# Patient Record
Sex: MALE | Race: WHITE | Employment: OTHER | URBAN - METROPOLITAN AREA
[De-identification: names, ages, dates, MRNs, and addresses within clinical notes are randomized per-mention and may not be internally consistent; named-entity substitution may affect disease eponyms.]

---

## 2019-03-26 ENCOUNTER — ANESTHESIA EVENT (OUTPATIENT)
Dept: OPERATING ROOM | Age: 69
DRG: 027 | End: 2019-03-26
Payer: OTHER GOVERNMENT

## 2019-03-26 RX ORDER — CLOPIDOGREL BISULFATE 75 MG/1
75 TABLET ORAL DAILY
Status: ON HOLD | COMMUNITY
End: 2019-03-29 | Stop reason: HOSPADM

## 2019-03-26 RX ORDER — ATORVASTATIN CALCIUM 80 MG/1
40 TABLET, FILM COATED ORAL DAILY
COMMUNITY

## 2019-03-26 RX ORDER — CARVEDILOL 25 MG/1
12.5 TABLET ORAL 2 TIMES DAILY WITH MEALS
COMMUNITY

## 2019-03-27 ENCOUNTER — HOSPITAL ENCOUNTER (INPATIENT)
Age: 69
LOS: 3 days | Discharge: HOME | DRG: 027 | End: 2019-03-30
Attending: NEUROLOGICAL SURGERY | Admitting: NEUROLOGICAL SURGERY
Payer: OTHER GOVERNMENT

## 2019-03-27 ENCOUNTER — ANESTHESIA (OUTPATIENT)
Dept: OPERATING ROOM | Age: 69
DRG: 027 | End: 2019-03-27
Payer: OTHER GOVERNMENT

## 2019-03-27 ENCOUNTER — APPOINTMENT (OUTPATIENT)
Dept: CT IMAGING | Age: 69
DRG: 027 | End: 2019-03-27
Attending: NEUROLOGICAL SURGERY
Payer: OTHER GOVERNMENT

## 2019-03-27 VITALS — TEMPERATURE: 66.7 F | DIASTOLIC BLOOD PRESSURE: 59 MMHG | OXYGEN SATURATION: 100 % | SYSTOLIC BLOOD PRESSURE: 107 MMHG

## 2019-03-27 DIAGNOSIS — Z98.890 S/P CRANIOTOMY: Primary | ICD-10-CM

## 2019-03-27 LAB
ABO/RH: NORMAL
ANTIBODY SCREEN: NORMAL
BASE EXCESS ARTERIAL: -2 (ref -3–3)
BASE EXCESS ARTERIAL: -3 (ref -3–3)
BASE EXCESS ARTERIAL: -3 (ref -3–3)
CALCIUM IONIZED: 1.14 MMOL/L (ref 1.12–1.32)
CALCIUM IONIZED: 1.15 MMOL/L (ref 1.12–1.32)
CALCIUM IONIZED: 1.19 MMOL/L (ref 1.12–1.32)
CREAT SERPL-MCNC: 0.8 MG/DL (ref 0.8–1.3)
GFR AFRICAN AMERICAN: >60
GFR NON-AFRICAN AMERICAN: >60
GLUCOSE BLD-MCNC: 153 MG/DL (ref 70–99)
GLUCOSE BLD-MCNC: 165 MG/DL (ref 70–99)
GLUCOSE BLD-MCNC: 174 MG/DL (ref 70–99)
GLUCOSE BLD-MCNC: 191 MG/DL (ref 70–99)
HCO3 ARTERIAL: 22.3 MMOL/L (ref 21–29)
HCO3 ARTERIAL: 22.6 MMOL/L (ref 21–29)
HCO3 ARTERIAL: 22.7 MMOL/L (ref 21–29)
MAGNESIUM: 2.2 MG/DL (ref 1.8–2.4)
O2 SAT, ARTERIAL: 100 % (ref 93–100)
O2 SAT, ARTERIAL: 96 % (ref 93–100)
O2 SAT, ARTERIAL: 97 % (ref 93–100)
PCO2 ARTERIAL: 35 MM HG (ref 35–45)
PCO2 ARTERIAL: 39.1 MM HG (ref 35–45)
PCO2 ARTERIAL: 39.2 MM HG (ref 35–45)
PERFORMED ON: ABNORMAL
PH ARTERIAL: 7.36 (ref 7.35–7.45)
PH ARTERIAL: 7.37 (ref 7.35–7.45)
PH ARTERIAL: 7.42 (ref 7.35–7.45)
PO2 ARTERIAL: 215.6 MM HG (ref 75–108)
PO2 ARTERIAL: 82.1 MM HG (ref 75–108)
PO2 ARTERIAL: 91 MM HG (ref 75–108)
POC POTASSIUM: 3.6 MMOL/L (ref 3.5–5.1)
POC POTASSIUM: 3.7 MMOL/L (ref 3.5–5.1)
POC POTASSIUM: 4.9 MMOL/L (ref 3.5–5.1)
POC SAMPLE TYPE: ABNORMAL
POC SODIUM: 139 MMOL/L (ref 136–145)
POC SODIUM: 144 MMOL/L (ref 136–145)
POC SODIUM: 145 MMOL/L (ref 136–145)
POTASSIUM SERPL-SCNC: 4 MMOL/L (ref 3.5–5.1)
TCO2 ARTERIAL: 24 MMOL/L

## 2019-03-27 PROCEDURE — 6360000002 HC RX W HCPCS: Performed by: NURSE ANESTHETIST, CERTIFIED REGISTERED

## 2019-03-27 PROCEDURE — 2500000003 HC RX 250 WO HCPCS: Performed by: NURSE ANESTHETIST, CERTIFIED REGISTERED

## 2019-03-27 PROCEDURE — 2580000003 HC RX 258: Performed by: NEUROLOGICAL SURGERY

## 2019-03-27 PROCEDURE — 88307 TISSUE EXAM BY PATHOLOGIST: CPT

## 2019-03-27 PROCEDURE — 7100000000 HC PACU RECOVERY - FIRST 15 MIN: Performed by: NEUROLOGICAL SURGERY

## 2019-03-27 PROCEDURE — 3700000001 HC ADD 15 MINUTES (ANESTHESIA): Performed by: NEUROLOGICAL SURGERY

## 2019-03-27 PROCEDURE — 2780000010 HC IMPLANT OTHER: Performed by: NEUROLOGICAL SURGERY

## 2019-03-27 PROCEDURE — 6360000002 HC RX W HCPCS

## 2019-03-27 PROCEDURE — 82947 ASSAY GLUCOSE BLOOD QUANT: CPT

## 2019-03-27 PROCEDURE — 3600000014 HC SURGERY LEVEL 4 ADDTL 15MIN: Performed by: NEUROLOGICAL SURGERY

## 2019-03-27 PROCEDURE — 82565 ASSAY OF CREATININE: CPT

## 2019-03-27 PROCEDURE — 6360000002 HC RX W HCPCS: Performed by: NEUROLOGICAL SURGERY

## 2019-03-27 PROCEDURE — 6370000000 HC RX 637 (ALT 250 FOR IP): Performed by: NEUROLOGICAL SURGERY

## 2019-03-27 PROCEDURE — 2500000003 HC RX 250 WO HCPCS: Performed by: ANESTHESIOLOGY

## 2019-03-27 PROCEDURE — 6360000002 HC RX W HCPCS: Performed by: ANESTHESIOLOGY

## 2019-03-27 PROCEDURE — 6370000000 HC RX 637 (ALT 250 FOR IP)

## 2019-03-27 PROCEDURE — 84295 ASSAY OF SERUM SODIUM: CPT

## 2019-03-27 PROCEDURE — 3700000000 HC ANESTHESIA ATTENDED CARE: Performed by: NEUROLOGICAL SURGERY

## 2019-03-27 PROCEDURE — 82330 ASSAY OF CALCIUM: CPT

## 2019-03-27 PROCEDURE — 86850 RBC ANTIBODY SCREEN: CPT

## 2019-03-27 PROCEDURE — P9041 ALBUMIN (HUMAN),5%, 50ML: HCPCS | Performed by: NURSE ANESTHETIST, CERTIFIED REGISTERED

## 2019-03-27 PROCEDURE — 82803 BLOOD GASES ANY COMBINATION: CPT

## 2019-03-27 PROCEDURE — 94761 N-INVAS EAR/PLS OXIMETRY MLT: CPT

## 2019-03-27 PROCEDURE — 2720000010 HC SURG SUPPLY STERILE: Performed by: NEUROLOGICAL SURGERY

## 2019-03-27 PROCEDURE — 2580000003 HC RX 258: Performed by: ANESTHESIOLOGY

## 2019-03-27 PROCEDURE — 7100000001 HC PACU RECOVERY - ADDTL 15 MIN: Performed by: NEUROLOGICAL SURGERY

## 2019-03-27 PROCEDURE — 2709999900 HC NON-CHARGEABLE SUPPLY: Performed by: NEUROLOGICAL SURGERY

## 2019-03-27 PROCEDURE — 6360000002 HC RX W HCPCS: Performed by: NURSE PRACTITIONER

## 2019-03-27 PROCEDURE — 6370000000 HC RX 637 (ALT 250 FOR IP): Performed by: STUDENT IN AN ORGANIZED HEALTH CARE EDUCATION/TRAINING PROGRAM

## 2019-03-27 PROCEDURE — 86900 BLOOD TYPING SEROLOGIC ABO: CPT

## 2019-03-27 PROCEDURE — C1713 ANCHOR/SCREW BN/BN,TIS/BN: HCPCS | Performed by: NEUROLOGICAL SURGERY

## 2019-03-27 PROCEDURE — 2580000003 HC RX 258: Performed by: NURSE ANESTHETIST, CERTIFIED REGISTERED

## 2019-03-27 PROCEDURE — 2500000003 HC RX 250 WO HCPCS: Performed by: NEUROLOGICAL SURGERY

## 2019-03-27 PROCEDURE — 83735 ASSAY OF MAGNESIUM: CPT

## 2019-03-27 PROCEDURE — 70450 CT HEAD/BRAIN W/O DYE: CPT

## 2019-03-27 PROCEDURE — 88331 PATH CONSLTJ SURG 1 BLK 1SPC: CPT

## 2019-03-27 PROCEDURE — 86901 BLOOD TYPING SEROLOGIC RH(D): CPT

## 2019-03-27 PROCEDURE — 84132 ASSAY OF SERUM POTASSIUM: CPT

## 2019-03-27 PROCEDURE — 94660 CPAP INITIATION&MGMT: CPT

## 2019-03-27 PROCEDURE — 6370000000 HC RX 637 (ALT 250 FOR IP): Performed by: NURSE PRACTITIONER

## 2019-03-27 PROCEDURE — 36415 COLL VENOUS BLD VENIPUNCTURE: CPT

## 2019-03-27 PROCEDURE — 2500000003 HC RX 250 WO HCPCS: Performed by: STUDENT IN AN ORGANIZED HEALTH CARE EDUCATION/TRAINING PROGRAM

## 2019-03-27 PROCEDURE — 2000000000 HC ICU R&B

## 2019-03-27 PROCEDURE — 2700000000 HC OXYGEN THERAPY PER DAY

## 2019-03-27 PROCEDURE — 94664 DEMO&/EVAL PT USE INHALER: CPT

## 2019-03-27 PROCEDURE — 94640 AIRWAY INHALATION TREATMENT: CPT

## 2019-03-27 PROCEDURE — 3600000004 HC SURGERY LEVEL 4 BASE: Performed by: NEUROLOGICAL SURGERY

## 2019-03-27 PROCEDURE — 2580000003 HC RX 258: Performed by: NURSE PRACTITIONER

## 2019-03-27 DEVICE — SEALANT SURG 13 YR DURA AUTOSPRAY ADHERUS NUS106] SURGICAL ONE]: Type: IMPLANTABLE DEVICE | Site: CRANIAL | Status: FUNCTIONAL

## 2019-03-27 DEVICE — SCREW BNE L4MM DIA1.5MM CRANIOFACIAL TI SELF DRL: Type: IMPLANTABLE DEVICE | Site: CRANIAL | Status: FUNCTIONAL

## 2019-03-27 DEVICE — ULTRA THIN SHEET
Type: IMPLANTABLE DEVICE | Site: CRANIAL | Status: FUNCTIONAL
Brand: MEDPOR

## 2019-03-27 RX ORDER — OXYCODONE HYDROCHLORIDE AND ACETAMINOPHEN 5; 325 MG/1; MG/1
1 TABLET ORAL EVERY 4 HOURS PRN
Status: DISCONTINUED | OUTPATIENT
Start: 2019-03-27 | End: 2019-03-30 | Stop reason: HOSPADM

## 2019-03-27 RX ORDER — METOPROLOL TARTRATE 5 MG/5ML
INJECTION INTRAVENOUS
Status: DISCONTINUED
Start: 2019-03-27 | End: 2019-03-27 | Stop reason: WASHOUT

## 2019-03-27 RX ORDER — LEVETIRACETAM 500 MG/1
500 TABLET ORAL 2 TIMES DAILY
Status: DISCONTINUED | OUTPATIENT
Start: 2019-03-27 | End: 2019-03-30 | Stop reason: HOSPADM

## 2019-03-27 RX ORDER — HEPARIN SODIUM 5000 [USP'U]/ML
5000 INJECTION, SOLUTION INTRAVENOUS; SUBCUTANEOUS EVERY 8 HOURS
Status: DISCONTINUED | OUTPATIENT
Start: 2019-03-28 | End: 2019-03-30 | Stop reason: HOSPADM

## 2019-03-27 RX ORDER — CEFAZOLIN SODIUM 2 G/50ML
2 SOLUTION INTRAVENOUS ONCE
Status: COMPLETED | OUTPATIENT
Start: 2019-03-27 | End: 2019-03-27

## 2019-03-27 RX ORDER — PROPOFOL 10 MG/ML
INJECTION, EMULSION INTRAVENOUS PRN
Status: DISCONTINUED | OUTPATIENT
Start: 2019-03-27 | End: 2019-03-27 | Stop reason: SDUPTHER

## 2019-03-27 RX ORDER — FENTANYL CITRATE 50 UG/ML
50 INJECTION, SOLUTION INTRAMUSCULAR; INTRAVENOUS EVERY 5 MIN PRN
Status: DISCONTINUED | OUTPATIENT
Start: 2019-03-27 | End: 2019-03-27 | Stop reason: HOSPADM

## 2019-03-27 RX ORDER — SODIUM CHLORIDE 0.9 % (FLUSH) 0.9 %
10 SYRINGE (ML) INJECTION PRN
Status: DISCONTINUED | OUTPATIENT
Start: 2019-03-27 | End: 2019-03-30 | Stop reason: HOSPADM

## 2019-03-27 RX ORDER — ATORVASTATIN CALCIUM 80 MG/1
80 TABLET, FILM COATED ORAL DAILY
Status: DISCONTINUED | OUTPATIENT
Start: 2019-03-28 | End: 2019-03-28

## 2019-03-27 RX ORDER — CEFAZOLIN SODIUM 2 G/50ML
2 SOLUTION INTRAVENOUS EVERY 8 HOURS
Status: COMPLETED | OUTPATIENT
Start: 2019-03-28 | End: 2019-03-28

## 2019-03-27 RX ORDER — METOPROLOL TARTRATE 5 MG/5ML
INJECTION INTRAVENOUS PRN
Status: DISCONTINUED | OUTPATIENT
Start: 2019-03-27 | End: 2019-03-27 | Stop reason: SDUPTHER

## 2019-03-27 RX ORDER — PROMETHAZINE HYDROCHLORIDE 25 MG/ML
6.25 INJECTION, SOLUTION INTRAMUSCULAR; INTRAVENOUS
Status: DISCONTINUED | OUTPATIENT
Start: 2019-03-27 | End: 2019-03-27 | Stop reason: HOSPADM

## 2019-03-27 RX ORDER — MAGNESIUM SULFATE HEPTAHYDRATE 500 MG/ML
INJECTION, SOLUTION INTRAMUSCULAR; INTRAVENOUS PRN
Status: DISCONTINUED | OUTPATIENT
Start: 2019-03-27 | End: 2019-03-27 | Stop reason: SDUPTHER

## 2019-03-27 RX ORDER — SODIUM CHLORIDE, SODIUM LACTATE, POTASSIUM CHLORIDE, CALCIUM CHLORIDE 600; 310; 30; 20 MG/100ML; MG/100ML; MG/100ML; MG/100ML
INJECTION, SOLUTION INTRAVENOUS CONTINUOUS PRN
Status: DISCONTINUED | OUTPATIENT
Start: 2019-03-27 | End: 2019-03-27 | Stop reason: SDUPTHER

## 2019-03-27 RX ORDER — BISACODYL 10 MG
10 SUPPOSITORY, RECTAL RECTAL DAILY PRN
Status: DISCONTINUED | OUTPATIENT
Start: 2019-03-27 | End: 2019-03-30 | Stop reason: HOSPADM

## 2019-03-27 RX ORDER — NALOXONE HYDROCHLORIDE 0.4 MG/ML
0.2 INJECTION, SOLUTION INTRAMUSCULAR; INTRAVENOUS; SUBCUTANEOUS PRN
Status: DISCONTINUED | OUTPATIENT
Start: 2019-03-27 | End: 2019-03-30 | Stop reason: HOSPADM

## 2019-03-27 RX ORDER — CARVEDILOL 25 MG/1
25 TABLET ORAL 2 TIMES DAILY WITH MEALS
Status: DISCONTINUED | OUTPATIENT
Start: 2019-03-28 | End: 2019-03-28

## 2019-03-27 RX ORDER — LIDOCAINE HYDROCHLORIDE 20 MG/ML
INJECTION, SOLUTION INFILTRATION; PERINEURAL PRN
Status: DISCONTINUED | OUTPATIENT
Start: 2019-03-27 | End: 2019-03-27 | Stop reason: SDUPTHER

## 2019-03-27 RX ORDER — FENTANYL CITRATE 50 UG/ML
INJECTION, SOLUTION INTRAMUSCULAR; INTRAVENOUS PRN
Status: DISCONTINUED | OUTPATIENT
Start: 2019-03-27 | End: 2019-03-27 | Stop reason: SDUPTHER

## 2019-03-27 RX ORDER — FENTANYL CITRATE 50 UG/ML
25 INJECTION, SOLUTION INTRAMUSCULAR; INTRAVENOUS EVERY 5 MIN PRN
Status: DISCONTINUED | OUTPATIENT
Start: 2019-03-27 | End: 2019-03-27 | Stop reason: HOSPADM

## 2019-03-27 RX ORDER — HYDROMORPHONE HCL 110MG/55ML
PATIENT CONTROLLED ANALGESIA SYRINGE INTRAVENOUS PRN
Status: DISCONTINUED | OUTPATIENT
Start: 2019-03-27 | End: 2019-03-27 | Stop reason: SDUPTHER

## 2019-03-27 RX ORDER — MAGNESIUM HYDROXIDE 1200 MG/15ML
LIQUID ORAL CONTINUOUS PRN
Status: DISCONTINUED | OUTPATIENT
Start: 2019-03-27 | End: 2019-03-27 | Stop reason: HOSPADM

## 2019-03-27 RX ORDER — EPHEDRINE SULFATE 50 MG/ML
INJECTION, SOLUTION INTRAVENOUS PRN
Status: DISCONTINUED | OUTPATIENT
Start: 2019-03-27 | End: 2019-03-27 | Stop reason: SDUPTHER

## 2019-03-27 RX ORDER — PROPOFOL 10 MG/ML
INJECTION, EMULSION INTRAVENOUS CONTINUOUS PRN
Status: DISCONTINUED | OUTPATIENT
Start: 2019-03-27 | End: 2019-03-27 | Stop reason: SDUPTHER

## 2019-03-27 RX ORDER — SODIUM CHLORIDE 9 MG/ML
INJECTION, SOLUTION INTRAVENOUS CONTINUOUS
Status: DISCONTINUED | OUTPATIENT
Start: 2019-03-27 | End: 2019-03-28

## 2019-03-27 RX ORDER — SODIUM CHLORIDE, SODIUM LACTATE, POTASSIUM CHLORIDE, CALCIUM CHLORIDE 600; 310; 30; 20 MG/100ML; MG/100ML; MG/100ML; MG/100ML
INJECTION, SOLUTION INTRAVENOUS CONTINUOUS
Status: DISCONTINUED | OUTPATIENT
Start: 2019-03-27 | End: 2019-03-27

## 2019-03-27 RX ORDER — SODIUM CHLORIDE 0.9 % (FLUSH) 0.9 %
10 SYRINGE (ML) INJECTION EVERY 12 HOURS SCHEDULED
Status: DISCONTINUED | OUTPATIENT
Start: 2019-03-27 | End: 2019-03-30 | Stop reason: HOSPADM

## 2019-03-27 RX ORDER — ALBUTEROL SULFATE 2.5 MG/3ML
2.5 SOLUTION RESPIRATORY (INHALATION) ONCE
Status: COMPLETED | OUTPATIENT
Start: 2019-03-27 | End: 2019-03-27

## 2019-03-27 RX ORDER — SENNA AND DOCUSATE SODIUM 50; 8.6 MG/1; MG/1
1 TABLET, FILM COATED ORAL 2 TIMES DAILY
Status: DISCONTINUED | OUTPATIENT
Start: 2019-03-27 | End: 2019-03-30 | Stop reason: HOSPADM

## 2019-03-27 RX ORDER — ACETAMINOPHEN 325 MG/1
650 TABLET ORAL EVERY 4 HOURS PRN
Status: DISCONTINUED | OUTPATIENT
Start: 2019-03-27 | End: 2019-03-30 | Stop reason: HOSPADM

## 2019-03-27 RX ORDER — SODIUM CHLORIDE 9 MG/ML
INJECTION, SOLUTION INTRAVENOUS CONTINUOUS PRN
Status: DISCONTINUED | OUTPATIENT
Start: 2019-03-27 | End: 2019-03-27 | Stop reason: SDUPTHER

## 2019-03-27 RX ORDER — FIBRINOGEN HUMAN AND THROMBIN HUMAN 90; 500 [IU]/ML; [IU]/ML
SOLUTION TOPICAL PRN
Status: DISCONTINUED | OUTPATIENT
Start: 2019-03-27 | End: 2019-03-27 | Stop reason: HOSPADM

## 2019-03-27 RX ORDER — BUPIVACAINE HYDROCHLORIDE AND EPINEPHRINE 5; 5 MG/ML; UG/ML
INJECTION, SOLUTION EPIDURAL; INTRACAUDAL; PERINEURAL PRN
Status: DISCONTINUED | OUTPATIENT
Start: 2019-03-27 | End: 2019-03-27 | Stop reason: HOSPADM

## 2019-03-27 RX ORDER — ONDANSETRON 2 MG/ML
INJECTION INTRAMUSCULAR; INTRAVENOUS PRN
Status: DISCONTINUED | OUTPATIENT
Start: 2019-03-27 | End: 2019-03-27 | Stop reason: SDUPTHER

## 2019-03-27 RX ORDER — GLYCOPYRROLATE 1 MG/5 ML
SYRINGE (ML) INTRAVENOUS PRN
Status: DISCONTINUED | OUTPATIENT
Start: 2019-03-27 | End: 2019-03-27 | Stop reason: SDUPTHER

## 2019-03-27 RX ORDER — ONDANSETRON 2 MG/ML
4 INJECTION INTRAMUSCULAR; INTRAVENOUS
Status: DISCONTINUED | OUTPATIENT
Start: 2019-03-27 | End: 2019-03-27 | Stop reason: HOSPADM

## 2019-03-27 RX ORDER — ONDANSETRON 2 MG/ML
4 INJECTION INTRAMUSCULAR; INTRAVENOUS EVERY 6 HOURS PRN
Status: DISCONTINUED | OUTPATIENT
Start: 2019-03-27 | End: 2019-03-30 | Stop reason: HOSPADM

## 2019-03-27 RX ORDER — ALBUMIN, HUMAN INJ 5% 5 %
SOLUTION INTRAVENOUS PRN
Status: DISCONTINUED | OUTPATIENT
Start: 2019-03-27 | End: 2019-03-27 | Stop reason: SDUPTHER

## 2019-03-27 RX ORDER — ALBUTEROL SULFATE 2.5 MG/3ML
SOLUTION RESPIRATORY (INHALATION)
Status: DISPENSED
Start: 2019-03-27 | End: 2019-03-28

## 2019-03-27 RX ORDER — NALOXONE HYDROCHLORIDE 0.4 MG/ML
INJECTION, SOLUTION INTRAMUSCULAR; INTRAVENOUS; SUBCUTANEOUS
Status: COMPLETED
Start: 2019-03-27 | End: 2019-03-27

## 2019-03-27 RX ORDER — DEXAMETHASONE SODIUM PHOSPHATE 4 MG/ML
INJECTION, SOLUTION INTRA-ARTICULAR; INTRALESIONAL; INTRAMUSCULAR; INTRAVENOUS; SOFT TISSUE PRN
Status: DISCONTINUED | OUTPATIENT
Start: 2019-03-27 | End: 2019-03-27 | Stop reason: SDUPTHER

## 2019-03-27 RX ORDER — SUCCINYLCHOLINE CHLORIDE 20 MG/ML
INJECTION INTRAMUSCULAR; INTRAVENOUS PRN
Status: DISCONTINUED | OUTPATIENT
Start: 2019-03-27 | End: 2019-03-27 | Stop reason: SDUPTHER

## 2019-03-27 RX ORDER — NALOXONE HYDROCHLORIDE 0.4 MG/ML
0.1 INJECTION, SOLUTION INTRAMUSCULAR; INTRAVENOUS; SUBCUTANEOUS PRN
Status: DISCONTINUED | OUTPATIENT
Start: 2019-03-27 | End: 2019-03-27 | Stop reason: HOSPADM

## 2019-03-27 RX ORDER — LABETALOL HYDROCHLORIDE 5 MG/ML
5 INJECTION, SOLUTION INTRAVENOUS EVERY 10 MIN PRN
Status: DISCONTINUED | OUTPATIENT
Start: 2019-03-27 | End: 2019-03-30 | Stop reason: HOSPADM

## 2019-03-27 RX ORDER — POTASSIUM CHLORIDE 7.45 MG/ML
10 INJECTION INTRAVENOUS ONCE
Status: COMPLETED | OUTPATIENT
Start: 2019-03-27 | End: 2019-03-27

## 2019-03-27 RX ORDER — POLYETHYLENE GLYCOL 3350 17 G/17G
17 POWDER, FOR SOLUTION ORAL DAILY PRN
Status: DISCONTINUED | OUTPATIENT
Start: 2019-03-27 | End: 2019-03-30 | Stop reason: HOSPADM

## 2019-03-27 RX ORDER — OXYCODONE HYDROCHLORIDE AND ACETAMINOPHEN 5; 325 MG/1; MG/1
2 TABLET ORAL EVERY 4 HOURS PRN
Status: DISCONTINUED | OUTPATIENT
Start: 2019-03-27 | End: 2019-03-30 | Stop reason: HOSPADM

## 2019-03-27 RX ADMIN — CEFAZOLIN SODIUM 2 G: 2 SOLUTION INTRAVENOUS at 22:41

## 2019-03-27 RX ADMIN — EPHEDRINE SULFATE 10 MG: 50 INJECTION, SOLUTION INTRAMUSCULAR; INTRAVENOUS; SUBCUTANEOUS at 08:46

## 2019-03-27 RX ADMIN — PHENYLEPHRINE HYDROCHLORIDE 10 MCG/MIN: 10 INJECTION, SOLUTION INTRAMUSCULAR; INTRAVENOUS; SUBCUTANEOUS at 08:40

## 2019-03-27 RX ADMIN — ALBUTEROL SULFATE 2.5 MG: 2.5 SOLUTION RESPIRATORY (INHALATION) at 18:56

## 2019-03-27 RX ADMIN — SODIUM CHLORIDE, SODIUM LACTATE, POTASSIUM CHLORIDE, AND CALCIUM CHLORIDE: 600; 310; 30; 20 INJECTION, SOLUTION INTRAVENOUS at 07:47

## 2019-03-27 RX ADMIN — LIDOCAINE HYDROCHLORIDE 200 MG: 20 INJECTION, SOLUTION INFILTRATION; PERINEURAL at 08:36

## 2019-03-27 RX ADMIN — FAMOTIDINE 20 MG: 10 INJECTION, SOLUTION INTRAVENOUS at 08:30

## 2019-03-27 RX ADMIN — FENTANYL CITRATE 50 MCG: 50 INJECTION INTRAMUSCULAR; INTRAVENOUS at 13:29

## 2019-03-27 RX ADMIN — DEXAMETHASONE SODIUM PHOSPHATE 10 MG: 4 INJECTION, SOLUTION INTRAMUSCULAR; INTRAVENOUS at 08:36

## 2019-03-27 RX ADMIN — LEVETIRACETAM 500 MG: 500 TABLET ORAL at 22:41

## 2019-03-27 RX ADMIN — PROPOFOL 150 MCG/KG/MIN: 10 INJECTION, EMULSION INTRAVENOUS at 08:38

## 2019-03-27 RX ADMIN — HYDROMORPHONE HYDROCHLORIDE 0.5 MG: 2 INJECTION, SOLUTION INTRAMUSCULAR; INTRAVENOUS; SUBCUTANEOUS at 17:32

## 2019-03-27 RX ADMIN — NICARDIPINE HYDROCHLORIDE 5 MG/HR: 0.1 INJECTION, SOLUTION INTRAVENOUS at 22:40

## 2019-03-27 RX ADMIN — NALOXONE HYDROCHLORIDE 0.1 MG: 0.4 INJECTION, SOLUTION INTRAMUSCULAR; INTRAVENOUS; SUBCUTANEOUS at 18:50

## 2019-03-27 RX ADMIN — LABETALOL HYDROCHLORIDE 5 MG: 5 INJECTION, SOLUTION INTRAVENOUS at 22:07

## 2019-03-27 RX ADMIN — LABETALOL HYDROCHLORIDE 5 MG: 5 INJECTION, SOLUTION INTRAVENOUS at 19:10

## 2019-03-27 RX ADMIN — HYDROMORPHONE HYDROCHLORIDE 1 MG: 2 INJECTION, SOLUTION INTRAMUSCULAR; INTRAVENOUS; SUBCUTANEOUS at 14:25

## 2019-03-27 RX ADMIN — REMIFENTANIL HYDROCHLORIDE 0.15 MCG/KG/MIN: 1 INJECTION, POWDER, LYOPHILIZED, FOR SOLUTION INTRAVENOUS at 08:36

## 2019-03-27 RX ADMIN — ALBUMIN (HUMAN) 250 ML: 12.5 INJECTION, SOLUTION INTRAVENOUS at 11:26

## 2019-03-27 RX ADMIN — OXYCODONE HYDROCHLORIDE AND ACETAMINOPHEN 1 TABLET: 5; 325 TABLET ORAL at 23:30

## 2019-03-27 RX ADMIN — FENTANYL CITRATE 50 MCG: 50 INJECTION INTRAMUSCULAR; INTRAVENOUS at 13:32

## 2019-03-27 RX ADMIN — PROPOFOL 200 MCG/KG/MIN: 10 INJECTION, EMULSION INTRAVENOUS at 08:36

## 2019-03-27 RX ADMIN — CEFAZOLIN SODIUM 2 G: 2 SOLUTION INTRAVENOUS at 12:23

## 2019-03-27 RX ADMIN — SODIUM CHLORIDE, SODIUM LACTATE, POTASSIUM CHLORIDE, AND CALCIUM CHLORIDE: 600; 310; 30; 20 INJECTION, SOLUTION INTRAVENOUS at 15:47

## 2019-03-27 RX ADMIN — CEFAZOLIN SODIUM 2 G: 2 SOLUTION INTRAVENOUS at 08:47

## 2019-03-27 RX ADMIN — ALBUMIN (HUMAN) 250 ML: 12.5 INJECTION, SOLUTION INTRAVENOUS at 08:44

## 2019-03-27 RX ADMIN — NALOXONE HYDROCHLORIDE 0.1 MG: 0.4 INJECTION, SOLUTION INTRAMUSCULAR; INTRAVENOUS; SUBCUTANEOUS at 18:45

## 2019-03-27 RX ADMIN — SODIUM CHLORIDE: 9 INJECTION, SOLUTION INTRAVENOUS at 19:31

## 2019-03-27 RX ADMIN — LABETALOL HYDROCHLORIDE 5 MG: 5 INJECTION, SOLUTION INTRAVENOUS at 19:22

## 2019-03-27 RX ADMIN — SODIUM CHLORIDE, SODIUM LACTATE, POTASSIUM CHLORIDE, AND CALCIUM CHLORIDE: 600; 310; 30; 20 INJECTION, SOLUTION INTRAVENOUS at 07:40

## 2019-03-27 RX ADMIN — FENTANYL CITRATE 100 MCG: 50 INJECTION INTRAMUSCULAR; INTRAVENOUS at 08:30

## 2019-03-27 RX ADMIN — ONDANSETRON 4 MG: 2 INJECTION INTRAMUSCULAR; INTRAVENOUS at 08:34

## 2019-03-27 RX ADMIN — MAGNESIUM SULFATE 2 G: 500 INJECTION, SOLUTION INTRAMUSCULAR; INTRAVENOUS at 14:48

## 2019-03-27 RX ADMIN — METOPROLOL TARTRATE 1.5 MG: 5 INJECTION, SOLUTION INTRAVENOUS at 14:27

## 2019-03-27 RX ADMIN — ACETAMINOPHEN 650 MG: 325 TABLET ORAL at 22:12

## 2019-03-27 RX ADMIN — Medication 0.3 MG: at 08:48

## 2019-03-27 RX ADMIN — PROPOFOL 200 MG: 10 INJECTION, EMULSION INTRAVENOUS at 08:36

## 2019-03-27 RX ADMIN — CEFAZOLIN SODIUM 2 G: 2 SOLUTION INTRAVENOUS at 16:23

## 2019-03-27 RX ADMIN — INSULIN HUMAN 2 UNITS: 100 INJECTION, SOLUTION PARENTERAL at 14:57

## 2019-03-27 RX ADMIN — SODIUM CHLORIDE, SODIUM LACTATE, POTASSIUM CHLORIDE, AND CALCIUM CHLORIDE: 600; 310; 30; 20 INJECTION, SOLUTION INTRAVENOUS at 15:14

## 2019-03-27 RX ADMIN — POTASSIUM CHLORIDE 10 MEQ: 10 INJECTION, SOLUTION INTRAVENOUS at 14:57

## 2019-03-27 RX ADMIN — PROPOFOL 120 MCG/KG/MIN: 10 INJECTION, EMULSION INTRAVENOUS at 08:58

## 2019-03-27 RX ADMIN — SODIUM CHLORIDE, SODIUM LACTATE, POTASSIUM CHLORIDE, AND CALCIUM CHLORIDE: 600; 310; 30; 20 INJECTION, SOLUTION INTRAVENOUS at 11:57

## 2019-03-27 RX ADMIN — SUCCINYLCHOLINE CHLORIDE 140 MG: 20 INJECTION, SOLUTION INTRAMUSCULAR; INTRAVENOUS; PARENTERAL at 08:36

## 2019-03-27 RX ADMIN — PHENYLEPHRINE HYDROCHLORIDE 20 MCG/MIN: 10 INJECTION, SOLUTION INTRAMUSCULAR; INTRAVENOUS; SUBCUTANEOUS at 08:48

## 2019-03-27 RX ADMIN — ONDANSETRON 4 MG: 2 INJECTION INTRAMUSCULAR; INTRAVENOUS at 08:30

## 2019-03-27 RX ADMIN — SODIUM CHLORIDE, SODIUM LACTATE, POTASSIUM CHLORIDE, AND CALCIUM CHLORIDE: 600; 310; 30; 20 INJECTION, SOLUTION INTRAVENOUS at 07:30

## 2019-03-27 RX ADMIN — SODIUM CHLORIDE: 900 INJECTION, SOLUTION INTRAVENOUS at 10:55

## 2019-03-27 ASSESSMENT — PULMONARY FUNCTION TESTS
PIF_VALUE: 21
PIF_VALUE: 35
PIF_VALUE: 21
PIF_VALUE: 20
PIF_VALUE: 20
PIF_VALUE: 22
PIF_VALUE: 20
PIF_VALUE: 19
PIF_VALUE: 21
PIF_VALUE: 1
PIF_VALUE: 21
PIF_VALUE: 15
PIF_VALUE: 20
PIF_VALUE: 21
PIF_VALUE: 19
PIF_VALUE: 24
PIF_VALUE: 21
PIF_VALUE: 20
PIF_VALUE: 21
PIF_VALUE: 21
PIF_VALUE: 20
PIF_VALUE: 21
PIF_VALUE: 25
PIF_VALUE: 23
PIF_VALUE: 20
PIF_VALUE: 20
PIF_VALUE: 21
PIF_VALUE: 21
PIF_VALUE: 18
PIF_VALUE: 21
PIF_VALUE: 22
PIF_VALUE: 23
PIF_VALUE: 20
PIF_VALUE: 21
PIF_VALUE: 21
PIF_VALUE: 20
PIF_VALUE: 21
PIF_VALUE: 20
PIF_VALUE: 19
PIF_VALUE: 21
PIF_VALUE: 20
PIF_VALUE: 21
PIF_VALUE: 21
PIF_VALUE: 20
PIF_VALUE: 21
PIF_VALUE: 20
PIF_VALUE: 20
PIF_VALUE: 21
PIF_VALUE: 21
PIF_VALUE: 20
PIF_VALUE: 19
PIF_VALUE: 19
PIF_VALUE: 21
PIF_VALUE: 20
PIF_VALUE: 21
PIF_VALUE: 20
PIF_VALUE: 15
PIF_VALUE: 21
PIF_VALUE: 21
PIF_VALUE: 19
PIF_VALUE: 21
PIF_VALUE: 20
PIF_VALUE: 21
PIF_VALUE: 19
PIF_VALUE: 21
PIF_VALUE: 20
PIF_VALUE: 19
PIF_VALUE: 21
PIF_VALUE: 20
PIF_VALUE: 22
PIF_VALUE: 20
PIF_VALUE: 20
PIF_VALUE: 22
PIF_VALUE: 20
PIF_VALUE: 21
PIF_VALUE: 14
PIF_VALUE: 20
PIF_VALUE: 21
PIF_VALUE: 20
PIF_VALUE: 21
PIF_VALUE: 19
PIF_VALUE: 21
PIF_VALUE: 20
PIF_VALUE: 21
PIF_VALUE: 20
PIF_VALUE: 21
PIF_VALUE: 20
PIF_VALUE: 20
PIF_VALUE: 21
PIF_VALUE: 0
PIF_VALUE: 22
PIF_VALUE: 22
PIF_VALUE: 20
PIF_VALUE: 19
PIF_VALUE: 21
PIF_VALUE: 21
PIF_VALUE: 20
PIF_VALUE: 21
PIF_VALUE: 18
PIF_VALUE: 20
PIF_VALUE: 21
PIF_VALUE: 20
PIF_VALUE: 2
PIF_VALUE: 21
PIF_VALUE: 21
PIF_VALUE: 20
PIF_VALUE: 20
PIF_VALUE: 21
PIF_VALUE: 21
PIF_VALUE: 19
PIF_VALUE: 19
PIF_VALUE: 21
PIF_VALUE: 19
PIF_VALUE: 21
PIF_VALUE: 18
PIF_VALUE: 20
PIF_VALUE: 20
PIF_VALUE: 19
PIF_VALUE: 20
PIF_VALUE: 19
PIF_VALUE: 4
PIF_VALUE: 20
PIF_VALUE: 21
PIF_VALUE: 20
PIF_VALUE: 20
PIF_VALUE: 21
PIF_VALUE: 20
PIF_VALUE: 21
PIF_VALUE: 21
PIF_VALUE: 19
PIF_VALUE: 20
PIF_VALUE: 21
PIF_VALUE: 7
PIF_VALUE: 20
PIF_VALUE: 20
PIF_VALUE: 21
PIF_VALUE: 20
PIF_VALUE: 21
PIF_VALUE: 20
PIF_VALUE: 21
PIF_VALUE: 21
PIF_VALUE: 20
PIF_VALUE: 20
PIF_VALUE: 21
PIF_VALUE: 20
PIF_VALUE: 21
PIF_VALUE: 21
PIF_VALUE: 20
PIF_VALUE: 20
PIF_VALUE: 22
PIF_VALUE: 21
PIF_VALUE: 20
PIF_VALUE: 21
PIF_VALUE: 22
PIF_VALUE: 21
PIF_VALUE: 20
PIF_VALUE: 1
PIF_VALUE: 20
PIF_VALUE: 19
PIF_VALUE: 21
PIF_VALUE: 20
PIF_VALUE: 21
PIF_VALUE: 20
PIF_VALUE: 21
PIF_VALUE: 20
PIF_VALUE: 21
PIF_VALUE: 20
PIF_VALUE: 20
PIF_VALUE: 21
PIF_VALUE: 21
PIF_VALUE: 20
PIF_VALUE: 20
PIF_VALUE: 21
PIF_VALUE: 20
PIF_VALUE: 21
PIF_VALUE: 18
PIF_VALUE: 21
PIF_VALUE: 21
PIF_VALUE: 0
PIF_VALUE: 2
PIF_VALUE: 20
PIF_VALUE: 20
PIF_VALUE: 21
PIF_VALUE: 23
PIF_VALUE: 20
PIF_VALUE: 21
PIF_VALUE: 21
PIF_VALUE: 16
PIF_VALUE: 21
PIF_VALUE: 20
PIF_VALUE: 21
PIF_VALUE: 2
PIF_VALUE: 21
PIF_VALUE: 22
PIF_VALUE: 20
PIF_VALUE: 19
PIF_VALUE: 20
PIF_VALUE: 19
PIF_VALUE: 21
PIF_VALUE: 20
PIF_VALUE: 20
PIF_VALUE: 21
PIF_VALUE: 20
PIF_VALUE: 20
PIF_VALUE: 21
PIF_VALUE: 22
PIF_VALUE: 20
PIF_VALUE: 22
PIF_VALUE: 21
PIF_VALUE: 16
PIF_VALUE: 21
PIF_VALUE: 19
PIF_VALUE: 21
PIF_VALUE: 19
PIF_VALUE: 21
PIF_VALUE: 20
PIF_VALUE: 21
PIF_VALUE: 21
PIF_VALUE: 20
PIF_VALUE: 21
PIF_VALUE: 20
PIF_VALUE: 22
PIF_VALUE: 21
PIF_VALUE: 20
PIF_VALUE: 21
PIF_VALUE: 20
PIF_VALUE: 21
PIF_VALUE: 20
PIF_VALUE: 21
PIF_VALUE: 22
PIF_VALUE: 21
PIF_VALUE: 21
PIF_VALUE: 9
PIF_VALUE: 21
PIF_VALUE: 20
PIF_VALUE: 21
PIF_VALUE: 21
PIF_VALUE: 20
PIF_VALUE: 22
PIF_VALUE: 21
PIF_VALUE: 20
PIF_VALUE: 20
PIF_VALUE: 19
PIF_VALUE: 20
PIF_VALUE: 0
PIF_VALUE: 20
PIF_VALUE: 19
PIF_VALUE: 20
PIF_VALUE: 21
PIF_VALUE: 20
PIF_VALUE: 21
PIF_VALUE: 21
PIF_VALUE: 20
PIF_VALUE: 21
PIF_VALUE: 20
PIF_VALUE: 21
PIF_VALUE: 20
PIF_VALUE: 20
PIF_VALUE: 21
PIF_VALUE: 19
PIF_VALUE: 20
PIF_VALUE: 21
PIF_VALUE: 21
PIF_VALUE: 20
PIF_VALUE: 20
PIF_VALUE: 11
PIF_VALUE: 21
PIF_VALUE: 6
PIF_VALUE: 20
PIF_VALUE: 21
PIF_VALUE: 21
PIF_VALUE: 0
PIF_VALUE: 21
PIF_VALUE: 21
PIF_VALUE: 0
PIF_VALUE: 21
PIF_VALUE: 21
PIF_VALUE: 19
PIF_VALUE: 21
PIF_VALUE: 20
PIF_VALUE: 19
PIF_VALUE: 21
PIF_VALUE: 19
PIF_VALUE: 20
PIF_VALUE: 21
PIF_VALUE: 22
PIF_VALUE: 21
PIF_VALUE: 20
PIF_VALUE: 22
PIF_VALUE: 21
PIF_VALUE: 21
PIF_VALUE: 20
PIF_VALUE: 26
PIF_VALUE: 21
PIF_VALUE: 18
PIF_VALUE: 21
PIF_VALUE: 20
PIF_VALUE: 21
PIF_VALUE: 21
PIF_VALUE: 20
PIF_VALUE: 21
PIF_VALUE: 23
PIF_VALUE: 23
PIF_VALUE: 21
PIF_VALUE: 19
PIF_VALUE: 22
PIF_VALUE: 20
PIF_VALUE: 20
PIF_VALUE: 21
PIF_VALUE: 20
PIF_VALUE: 21
PIF_VALUE: 20
PIF_VALUE: 20
PIF_VALUE: 21
PIF_VALUE: 20
PIF_VALUE: 19
PIF_VALUE: 20
PIF_VALUE: 20
PIF_VALUE: 19
PIF_VALUE: 20
PIF_VALUE: 21
PIF_VALUE: 22
PIF_VALUE: 19
PIF_VALUE: 19
PIF_VALUE: 21
PIF_VALUE: 20
PIF_VALUE: 21
PIF_VALUE: 21
PIF_VALUE: 22
PIF_VALUE: 21
PIF_VALUE: 19
PIF_VALUE: 20
PIF_VALUE: 20
PIF_VALUE: 19
PIF_VALUE: 21
PIF_VALUE: 19
PIF_VALUE: 21
PIF_VALUE: 21
PIF_VALUE: 19
PIF_VALUE: 20
PIF_VALUE: 21
PIF_VALUE: 20
PIF_VALUE: 21
PIF_VALUE: 20
PIF_VALUE: 21
PIF_VALUE: 18
PIF_VALUE: 21
PIF_VALUE: 20
PIF_VALUE: 20
PIF_VALUE: 21
PIF_VALUE: 20
PIF_VALUE: 22
PIF_VALUE: 21
PIF_VALUE: 5
PIF_VALUE: 22
PIF_VALUE: 18
PIF_VALUE: 19
PIF_VALUE: 20
PIF_VALUE: 19
PIF_VALUE: 21
PIF_VALUE: 20
PIF_VALUE: 20
PIF_VALUE: 21
PIF_VALUE: 20
PIF_VALUE: 21
PIF_VALUE: 20
PIF_VALUE: 21
PIF_VALUE: 20
PIF_VALUE: 2
PIF_VALUE: 17
PIF_VALUE: 5
PIF_VALUE: 21
PIF_VALUE: 21
PIF_VALUE: 20
PIF_VALUE: 22
PIF_VALUE: 21
PIF_VALUE: 20
PIF_VALUE: 1
PIF_VALUE: 20
PIF_VALUE: 21
PIF_VALUE: 20
PIF_VALUE: 22
PIF_VALUE: 20
PIF_VALUE: 21
PIF_VALUE: 19
PIF_VALUE: 19
PIF_VALUE: 21
PIF_VALUE: 20
PIF_VALUE: 21
PIF_VALUE: 21
PIF_VALUE: 19
PIF_VALUE: 21
PIF_VALUE: 21
PIF_VALUE: 20
PIF_VALUE: 27
PIF_VALUE: 19
PIF_VALUE: 21
PIF_VALUE: 20
PIF_VALUE: 29
PIF_VALUE: 18
PIF_VALUE: 21
PIF_VALUE: 20
PIF_VALUE: 21
PIF_VALUE: 19
PIF_VALUE: 18
PIF_VALUE: 21
PIF_VALUE: 20
PIF_VALUE: 20
PIF_VALUE: 21
PIF_VALUE: 22
PIF_VALUE: 15
PIF_VALUE: 21
PIF_VALUE: 21
PIF_VALUE: 18
PIF_VALUE: 21
PIF_VALUE: 20
PIF_VALUE: 23
PIF_VALUE: 2
PIF_VALUE: 20
PIF_VALUE: 17
PIF_VALUE: 20
PIF_VALUE: 21
PIF_VALUE: 20
PIF_VALUE: 21
PIF_VALUE: 21
PIF_VALUE: 29
PIF_VALUE: 20
PIF_VALUE: 20
PIF_VALUE: 18
PIF_VALUE: 20
PIF_VALUE: 21
PIF_VALUE: 18
PIF_VALUE: 23
PIF_VALUE: 20
PIF_VALUE: 21
PIF_VALUE: 20
PIF_VALUE: 21
PIF_VALUE: 20
PIF_VALUE: 6
PIF_VALUE: 21
PIF_VALUE: 20
PIF_VALUE: 21
PIF_VALUE: 21
PIF_VALUE: 2
PIF_VALUE: 21
PIF_VALUE: 19
PIF_VALUE: 20
PIF_VALUE: 21
PIF_VALUE: 20

## 2019-03-27 ASSESSMENT — ENCOUNTER SYMPTOMS
COLOR CHANGE: 0
VOMITING: 0
BACK PAIN: 0
ANAL BLEEDING: 0
DIARRHEA: 0
SHORTNESS OF BREATH: 0
COUGH: 1
WHEEZING: 0
NAUSEA: 0

## 2019-03-27 ASSESSMENT — PAIN SCALES - GENERAL
PAINLEVEL_OUTOF10: 0
PAINLEVEL_OUTOF10: 0
PAINLEVEL_OUTOF10: 5
PAINLEVEL_OUTOF10: 0

## 2019-03-27 ASSESSMENT — LIFESTYLE VARIABLES: SMOKING_STATUS: 0

## 2019-03-27 ASSESSMENT — PAIN - FUNCTIONAL ASSESSMENT: PAIN_FUNCTIONAL_ASSESSMENT: 0-10

## 2019-03-28 ENCOUNTER — APPOINTMENT (OUTPATIENT)
Dept: MRI IMAGING | Age: 69
DRG: 027 | End: 2019-03-28
Attending: NEUROLOGICAL SURGERY
Payer: OTHER GOVERNMENT

## 2019-03-28 PROBLEM — G47.33 OSA (OBSTRUCTIVE SLEEP APNEA): Status: ACTIVE | Noted: 2019-03-28

## 2019-03-28 PROBLEM — G93.89 BRAIN MASS: Status: ACTIVE | Noted: 2019-03-28

## 2019-03-28 PROBLEM — E78.5 HYPERLIPIDEMIA, ACQUIRED: Status: ACTIVE | Noted: 2019-03-28

## 2019-03-28 PROBLEM — I25.10 CORONARY ARTERY DISEASE INVOLVING NATIVE CORONARY ARTERY OF NATIVE HEART WITHOUT ANGINA PECTORIS: Status: ACTIVE | Noted: 2019-03-28

## 2019-03-28 PROBLEM — I10 ESSENTIAL HYPERTENSION: Status: ACTIVE | Noted: 2019-03-28

## 2019-03-28 LAB
ANION GAP SERPL CALCULATED.3IONS-SCNC: 11 MMOL/L (ref 3–16)
BUN BLDV-MCNC: 12 MG/DL (ref 7–20)
CALCIUM SERPL-MCNC: 8.1 MG/DL (ref 8.3–10.6)
CHLORIDE BLD-SCNC: 112 MMOL/L (ref 99–110)
CO2: 21 MMOL/L (ref 21–32)
CREAT SERPL-MCNC: 0.8 MG/DL (ref 0.8–1.3)
GFR AFRICAN AMERICAN: >60
GFR NON-AFRICAN AMERICAN: >60
GLUCOSE BLD-MCNC: 116 MG/DL (ref 70–99)
GLUCOSE BLD-MCNC: 170 MG/DL (ref 70–99)
HCT VFR BLD CALC: 40.7 % (ref 40.5–52.5)
HEMOGLOBIN: 13.7 G/DL (ref 13.5–17.5)
MCH RBC QN AUTO: 31.3 PG (ref 26–34)
MCHC RBC AUTO-ENTMCNC: 33.7 G/DL (ref 31–36)
MCV RBC AUTO: 93 FL (ref 80–100)
PDW BLD-RTO: 13.2 % (ref 12.4–15.4)
PERFORMED ON: ABNORMAL
PLATELET # BLD: 141 K/UL (ref 135–450)
PMV BLD AUTO: 8.7 FL (ref 5–10.5)
POTASSIUM REFLEX MAGNESIUM: 4.2 MMOL/L (ref 3.5–5.1)
RBC # BLD: 4.38 M/UL (ref 4.2–5.9)
SODIUM BLD-SCNC: 144 MMOL/L (ref 136–145)
WBC # BLD: 13.6 K/UL (ref 4–11)

## 2019-03-28 PROCEDURE — 99233 SBSQ HOSP IP/OBS HIGH 50: CPT | Performed by: INTERNAL MEDICINE

## 2019-03-28 PROCEDURE — 85027 COMPLETE CBC AUTOMATED: CPT

## 2019-03-28 PROCEDURE — 94761 N-INVAS EAR/PLS OXIMETRY MLT: CPT

## 2019-03-28 PROCEDURE — 83036 HEMOGLOBIN GLYCOSYLATED A1C: CPT

## 2019-03-28 PROCEDURE — 70553 MRI BRAIN STEM W/O & W/DYE: CPT

## 2019-03-28 PROCEDURE — 97530 THERAPEUTIC ACTIVITIES: CPT

## 2019-03-28 PROCEDURE — 2500000003 HC RX 250 WO HCPCS: Performed by: NURSE PRACTITIONER

## 2019-03-28 PROCEDURE — 94660 CPAP INITIATION&MGMT: CPT

## 2019-03-28 PROCEDURE — A9579 GAD-BASE MR CONTRAST NOS,1ML: HCPCS | Performed by: NEUROLOGICAL SURGERY

## 2019-03-28 PROCEDURE — 2580000003 HC RX 258: Performed by: NURSE PRACTITIONER

## 2019-03-28 PROCEDURE — 6370000000 HC RX 637 (ALT 250 FOR IP): Performed by: NURSE PRACTITIONER

## 2019-03-28 PROCEDURE — 1200000000 HC SEMI PRIVATE

## 2019-03-28 PROCEDURE — 80048 BASIC METABOLIC PNL TOTAL CA: CPT

## 2019-03-28 PROCEDURE — 36415 COLL VENOUS BLD VENIPUNCTURE: CPT

## 2019-03-28 PROCEDURE — 6360000002 HC RX W HCPCS: Performed by: NURSE PRACTITIONER

## 2019-03-28 PROCEDURE — 97162 PT EVAL MOD COMPLEX 30 MIN: CPT

## 2019-03-28 PROCEDURE — 6360000004 HC RX CONTRAST MEDICATION: Performed by: NEUROLOGICAL SURGERY

## 2019-03-28 PROCEDURE — 2700000000 HC OXYGEN THERAPY PER DAY

## 2019-03-28 PROCEDURE — 97535 SELF CARE MNGMENT TRAINING: CPT

## 2019-03-28 PROCEDURE — 97116 GAIT TRAINING THERAPY: CPT

## 2019-03-28 PROCEDURE — 00B10ZZ EXCISION OF CEREBRAL MENINGES, OPEN APPROACH: ICD-10-PCS | Performed by: OTOLARYNGOLOGY

## 2019-03-28 PROCEDURE — 97166 OT EVAL MOD COMPLEX 45 MIN: CPT

## 2019-03-28 PROCEDURE — 6370000000 HC RX 637 (ALT 250 FOR IP): Performed by: STUDENT IN AN ORGANIZED HEALTH CARE EDUCATION/TRAINING PROGRAM

## 2019-03-28 RX ORDER — NICOTINE POLACRILEX 4 MG
15 LOZENGE BUCCAL PRN
Status: DISCONTINUED | OUTPATIENT
Start: 2019-03-28 | End: 2019-03-30 | Stop reason: HOSPADM

## 2019-03-28 RX ORDER — CARVEDILOL 6.25 MG/1
12.5 TABLET ORAL 2 TIMES DAILY WITH MEALS
Status: DISCONTINUED | OUTPATIENT
Start: 2019-03-28 | End: 2019-03-30 | Stop reason: HOSPADM

## 2019-03-28 RX ORDER — DEXTROSE MONOHYDRATE 50 MG/ML
100 INJECTION, SOLUTION INTRAVENOUS PRN
Status: DISCONTINUED | OUTPATIENT
Start: 2019-03-28 | End: 2019-03-30 | Stop reason: HOSPADM

## 2019-03-28 RX ORDER — DEXTROSE MONOHYDRATE 25 G/50ML
12.5 INJECTION, SOLUTION INTRAVENOUS PRN
Status: DISCONTINUED | OUTPATIENT
Start: 2019-03-28 | End: 2019-03-30 | Stop reason: HOSPADM

## 2019-03-28 RX ORDER — FAMOTIDINE 20 MG/1
20 TABLET, FILM COATED ORAL 2 TIMES DAILY
Status: DISCONTINUED | OUTPATIENT
Start: 2019-03-28 | End: 2019-03-30 | Stop reason: HOSPADM

## 2019-03-28 RX ORDER — ATORVASTATIN CALCIUM 40 MG/1
40 TABLET, FILM COATED ORAL DAILY
Status: DISCONTINUED | OUTPATIENT
Start: 2019-03-29 | End: 2019-03-30 | Stop reason: HOSPADM

## 2019-03-28 RX ADMIN — SENNOSIDES AND DOCUSATE SODIUM 1 TABLET: 8.6; 5 TABLET ORAL at 09:47

## 2019-03-28 RX ADMIN — ATORVASTATIN CALCIUM 40 MG: 40 TABLET, FILM COATED ORAL at 10:04

## 2019-03-28 RX ADMIN — LEVETIRACETAM 500 MG: 500 TABLET ORAL at 09:46

## 2019-03-28 RX ADMIN — OXYCODONE HYDROCHLORIDE AND ACETAMINOPHEN 1 TABLET: 5; 325 TABLET ORAL at 07:26

## 2019-03-28 RX ADMIN — HEPARIN SODIUM 5000 UNITS: 5000 INJECTION INTRAVENOUS; SUBCUTANEOUS at 09:48

## 2019-03-28 RX ADMIN — FAMOTIDINE 20 MG: 10 INJECTION, SOLUTION INTRAVENOUS at 09:47

## 2019-03-28 RX ADMIN — GADOTERIDOL 20 ML: 279.3 INJECTION, SOLUTION INTRAVENOUS at 15:08

## 2019-03-28 RX ADMIN — Medication 10 ML: at 10:02

## 2019-03-28 RX ADMIN — LEVETIRACETAM 500 MG: 500 TABLET ORAL at 19:58

## 2019-03-28 RX ADMIN — CARVEDILOL 12.5 MG: 6.25 TABLET, FILM COATED ORAL at 17:31

## 2019-03-28 RX ADMIN — FAMOTIDINE 20 MG: 20 TABLET ORAL at 19:58

## 2019-03-28 RX ADMIN — CARVEDILOL 25 MG: 25 TABLET, FILM COATED ORAL at 08:20

## 2019-03-28 RX ADMIN — CEFAZOLIN SODIUM 2 G: 2 SOLUTION INTRAVENOUS at 08:21

## 2019-03-28 RX ADMIN — SENNOSIDES AND DOCUSATE SODIUM 1 TABLET: 8.6; 5 TABLET ORAL at 19:58

## 2019-03-28 RX ADMIN — OXYCODONE HYDROCHLORIDE AND ACETAMINOPHEN 1 TABLET: 5; 325 TABLET ORAL at 14:01

## 2019-03-28 RX ADMIN — HEPARIN SODIUM 5000 UNITS: 5000 INJECTION INTRAVENOUS; SUBCUTANEOUS at 17:31

## 2019-03-28 RX ADMIN — SODIUM CHLORIDE: 9 INJECTION, SOLUTION INTRAVENOUS at 04:08

## 2019-03-28 ASSESSMENT — PAIN DESCRIPTION - ORIENTATION
ORIENTATION: LEFT

## 2019-03-28 ASSESSMENT — PAIN SCALES - GENERAL
PAINLEVEL_OUTOF10: 2
PAINLEVEL_OUTOF10: 0
PAINLEVEL_OUTOF10: 4
PAINLEVEL_OUTOF10: 0
PAINLEVEL_OUTOF10: 6

## 2019-03-28 ASSESSMENT — PAIN DESCRIPTION - DESCRIPTORS
DESCRIPTORS: ACHING
DESCRIPTORS: ACHING

## 2019-03-28 ASSESSMENT — PAIN DESCRIPTION - PAIN TYPE
TYPE: ACUTE PAIN
TYPE: ACUTE PAIN

## 2019-03-28 ASSESSMENT — PAIN DESCRIPTION - FREQUENCY
FREQUENCY: CONTINUOUS
FREQUENCY: INTERMITTENT

## 2019-03-28 ASSESSMENT — PAIN - FUNCTIONAL ASSESSMENT
PAIN_FUNCTIONAL_ASSESSMENT: PREVENTS OR INTERFERES SOME ACTIVE ACTIVITIES AND ADLS
PAIN_FUNCTIONAL_ASSESSMENT: PREVENTS OR INTERFERES SOME ACTIVE ACTIVITIES AND ADLS

## 2019-03-28 ASSESSMENT — PAIN DESCRIPTION - ONSET
ONSET: GRADUAL
ONSET: GRADUAL

## 2019-03-28 ASSESSMENT — PAIN DESCRIPTION - LOCATION
LOCATION: HIP

## 2019-03-28 ASSESSMENT — PAIN DESCRIPTION - PROGRESSION
CLINICAL_PROGRESSION: GRADUALLY IMPROVING
CLINICAL_PROGRESSION: GRADUALLY WORSENING

## 2019-03-29 PROBLEM — Z98.890 S/P CRANIOTOMY: Status: ACTIVE | Noted: 2019-03-29

## 2019-03-29 LAB
BILIRUBIN URINE: NEGATIVE
BLOOD, URINE: ABNORMAL
CLARITY: CLEAR
COLOR: YELLOW
EPITHELIAL CELLS, UA: NORMAL /HPF
ESTIMATED AVERAGE GLUCOSE: 122.6 MG/DL
GLUCOSE BLD-MCNC: 118 MG/DL (ref 70–99)
GLUCOSE BLD-MCNC: 128 MG/DL (ref 70–99)
GLUCOSE BLD-MCNC: 145 MG/DL (ref 70–99)
GLUCOSE URINE: NEGATIVE MG/DL
HBA1C MFR BLD: 5.9 %
KETONES, URINE: NEGATIVE MG/DL
LEUKOCYTE ESTERASE, URINE: ABNORMAL
MICROSCOPIC EXAMINATION: YES
NITRITE, URINE: NEGATIVE
PERFORMED ON: ABNORMAL
PH UA: 6.5 (ref 5–8)
PROTEIN UA: NEGATIVE MG/DL
RBC UA: NORMAL /HPF (ref 0–2)
SPECIFIC GRAVITY UA: 1.01 (ref 1–1.03)
URINE TYPE: ABNORMAL
UROBILINOGEN, URINE: 0.2 E.U./DL
WBC UA: NORMAL /HPF (ref 0–5)

## 2019-03-29 PROCEDURE — 94660 CPAP INITIATION&MGMT: CPT

## 2019-03-29 PROCEDURE — 6370000000 HC RX 637 (ALT 250 FOR IP): Performed by: NURSE PRACTITIONER

## 2019-03-29 PROCEDURE — 1200000000 HC SEMI PRIVATE

## 2019-03-29 PROCEDURE — 97530 THERAPEUTIC ACTIVITIES: CPT

## 2019-03-29 PROCEDURE — 51798 US URINE CAPACITY MEASURE: CPT

## 2019-03-29 PROCEDURE — 2580000003 HC RX 258: Performed by: NURSE PRACTITIONER

## 2019-03-29 PROCEDURE — 81001 URINALYSIS AUTO W/SCOPE: CPT

## 2019-03-29 PROCEDURE — 6360000002 HC RX W HCPCS: Performed by: NURSE PRACTITIONER

## 2019-03-29 PROCEDURE — 94760 N-INVAS EAR/PLS OXIMETRY 1: CPT

## 2019-03-29 PROCEDURE — 97116 GAIT TRAINING THERAPY: CPT

## 2019-03-29 RX ORDER — OXYCODONE HYDROCHLORIDE AND ACETAMINOPHEN 5; 325 MG/1; MG/1
1 TABLET ORAL EVERY 6 HOURS PRN
Qty: 28 TABLET | Refills: 0 | Status: SHIPPED | OUTPATIENT
Start: 2019-03-29 | End: 2019-04-05

## 2019-03-29 RX ORDER — SENNA AND DOCUSATE SODIUM 50; 8.6 MG/1; MG/1
1 TABLET, FILM COATED ORAL 2 TIMES DAILY
Qty: 60 TABLET | Refills: 0 | Status: SHIPPED | OUTPATIENT
Start: 2019-03-29

## 2019-03-29 RX ORDER — LEVETIRACETAM 500 MG/1
500 TABLET ORAL 2 TIMES DAILY
Qty: 60 TABLET | Refills: 1 | Status: SHIPPED | OUTPATIENT
Start: 2019-03-29

## 2019-03-29 RX ADMIN — Medication 10 ML: at 09:06

## 2019-03-29 RX ADMIN — ATORVASTATIN CALCIUM 40 MG: 40 TABLET, FILM COATED ORAL at 09:03

## 2019-03-29 RX ADMIN — LEVETIRACETAM 500 MG: 500 TABLET ORAL at 21:02

## 2019-03-29 RX ADMIN — SENNOSIDES AND DOCUSATE SODIUM 1 TABLET: 8.6; 5 TABLET ORAL at 09:03

## 2019-03-29 RX ADMIN — ACETAMINOPHEN 650 MG: 325 TABLET ORAL at 19:49

## 2019-03-29 RX ADMIN — FAMOTIDINE 20 MG: 20 TABLET ORAL at 21:02

## 2019-03-29 RX ADMIN — HEPARIN SODIUM 5000 UNITS: 5000 INJECTION INTRAVENOUS; SUBCUTANEOUS at 17:10

## 2019-03-29 RX ADMIN — HEPARIN SODIUM 5000 UNITS: 5000 INJECTION INTRAVENOUS; SUBCUTANEOUS at 09:04

## 2019-03-29 RX ADMIN — CARVEDILOL 12.5 MG: 6.25 TABLET, FILM COATED ORAL at 17:09

## 2019-03-29 RX ADMIN — HEPARIN SODIUM 5000 UNITS: 5000 INJECTION INTRAVENOUS; SUBCUTANEOUS at 00:32

## 2019-03-29 RX ADMIN — FAMOTIDINE 20 MG: 20 TABLET ORAL at 09:02

## 2019-03-29 RX ADMIN — CARVEDILOL 12.5 MG: 6.25 TABLET, FILM COATED ORAL at 09:02

## 2019-03-29 RX ADMIN — SENNOSIDES AND DOCUSATE SODIUM 1 TABLET: 8.6; 5 TABLET ORAL at 21:02

## 2019-03-29 RX ADMIN — LEVETIRACETAM 500 MG: 500 TABLET ORAL at 09:03

## 2019-03-29 RX ADMIN — Medication 10 ML: at 21:02

## 2019-03-29 ASSESSMENT — PAIN DESCRIPTION - PAIN TYPE: TYPE: ACUTE PAIN

## 2019-03-29 ASSESSMENT — PAIN DESCRIPTION - LOCATION: LOCATION: HEAD

## 2019-03-29 ASSESSMENT — PAIN DESCRIPTION - PROGRESSION: CLINICAL_PROGRESSION: NOT CHANGED

## 2019-03-29 ASSESSMENT — PAIN SCALES - GENERAL
PAINLEVEL_OUTOF10: 3
PAINLEVEL_OUTOF10: 0

## 2019-03-29 ASSESSMENT — PAIN DESCRIPTION - ONSET: ONSET: ON-GOING

## 2019-03-29 ASSESSMENT — PAIN DESCRIPTION - DESCRIPTORS: DESCRIPTORS: ACHING

## 2019-03-29 ASSESSMENT — PAIN - FUNCTIONAL ASSESSMENT: PAIN_FUNCTIONAL_ASSESSMENT: ACTIVITIES ARE NOT PREVENTED

## 2019-03-29 ASSESSMENT — PAIN DESCRIPTION - FREQUENCY: FREQUENCY: CONTINUOUS

## 2019-03-29 ASSESSMENT — PAIN DESCRIPTION - ORIENTATION: ORIENTATION: MID

## 2019-03-30 VITALS
OXYGEN SATURATION: 93 % | TEMPERATURE: 98.4 F | SYSTOLIC BLOOD PRESSURE: 132 MMHG | HEART RATE: 61 BPM | DIASTOLIC BLOOD PRESSURE: 67 MMHG | RESPIRATION RATE: 16 BRPM | HEIGHT: 69 IN | BODY MASS INDEX: 29.71 KG/M2 | WEIGHT: 200.62 LBS

## 2019-03-30 LAB
GLUCOSE BLD-MCNC: 115 MG/DL (ref 70–99)
GLUCOSE BLD-MCNC: 132 MG/DL (ref 70–99)
PERFORMED ON: ABNORMAL
PERFORMED ON: ABNORMAL

## 2019-03-30 PROCEDURE — 97530 THERAPEUTIC ACTIVITIES: CPT

## 2019-03-30 PROCEDURE — 6370000000 HC RX 637 (ALT 250 FOR IP): Performed by: NURSE PRACTITIONER

## 2019-03-30 PROCEDURE — 2580000003 HC RX 258: Performed by: NURSE PRACTITIONER

## 2019-03-30 PROCEDURE — 97116 GAIT TRAINING THERAPY: CPT

## 2019-03-30 PROCEDURE — 6360000002 HC RX W HCPCS: Performed by: NURSE PRACTITIONER

## 2019-03-30 RX ORDER — ASPIRIN 81 MG/1
81 TABLET ORAL DAILY
Qty: 30 TABLET | Refills: 0 | Status: SHIPPED | OUTPATIENT
Start: 2019-03-30

## 2019-03-30 RX ADMIN — LEVETIRACETAM 500 MG: 500 TABLET ORAL at 09:30

## 2019-03-30 RX ADMIN — FAMOTIDINE 20 MG: 20 TABLET ORAL at 09:30

## 2019-03-30 RX ADMIN — HEPARIN SODIUM 5000 UNITS: 5000 INJECTION INTRAVENOUS; SUBCUTANEOUS at 09:36

## 2019-03-30 RX ADMIN — HEPARIN SODIUM 5000 UNITS: 5000 INJECTION INTRAVENOUS; SUBCUTANEOUS at 00:34

## 2019-03-30 RX ADMIN — Medication 10 ML: at 09:00

## 2019-03-30 RX ADMIN — SENNOSIDES AND DOCUSATE SODIUM 1 TABLET: 8.6; 5 TABLET ORAL at 09:30

## 2019-03-30 RX ADMIN — CARVEDILOL 12.5 MG: 6.25 TABLET, FILM COATED ORAL at 09:30

## 2019-03-30 RX ADMIN — ATORVASTATIN CALCIUM 40 MG: 40 TABLET, FILM COATED ORAL at 09:36

## 2022-03-31 ENCOUNTER — OFFICE VISIT (OUTPATIENT)
Dept: ORTHOPEDIC SURGERY | Age: 72
End: 2022-03-31
Payer: MEDICARE

## 2022-03-31 VITALS — BODY MASS INDEX: 29.62 KG/M2 | WEIGHT: 200 LBS | HEIGHT: 69 IN

## 2022-03-31 DIAGNOSIS — M75.41 IMPINGEMENT SYNDROME OF RIGHT SHOULDER: Primary | ICD-10-CM

## 2022-03-31 DIAGNOSIS — M75.81 ROTATOR CUFF TENDINITIS, RIGHT: ICD-10-CM

## 2022-03-31 PROCEDURE — 20610 DRAIN/INJ JOINT/BURSA W/O US: CPT | Performed by: ORTHOPAEDIC SURGERY

## 2022-03-31 PROCEDURE — 99204 OFFICE O/P NEW MOD 45 MIN: CPT | Performed by: ORTHOPAEDIC SURGERY

## 2022-03-31 RX ORDER — METHYLPREDNISOLONE ACETATE 40 MG/ML
40 INJECTION, SUSPENSION INTRA-ARTICULAR; INTRALESIONAL; INTRAMUSCULAR; SOFT TISSUE ONCE
Status: COMPLETED | OUTPATIENT
Start: 2022-03-31 | End: 2022-03-31

## 2022-03-31 RX ORDER — LIDOCAINE HYDROCHLORIDE 10 MG/ML
2 INJECTION, SOLUTION INFILTRATION; PERINEURAL ONCE
Status: COMPLETED | OUTPATIENT
Start: 2022-03-31 | End: 2022-03-31

## 2022-03-31 RX ADMIN — LIDOCAINE HYDROCHLORIDE 2 ML: 10 INJECTION, SOLUTION INFILTRATION; PERINEURAL at 10:21

## 2022-03-31 RX ADMIN — METHYLPREDNISOLONE ACETATE 40 MG: 40 INJECTION, SUSPENSION INTRA-ARTICULAR; INTRALESIONAL; INTRAMUSCULAR; SOFT TISSUE at 10:22

## 2022-03-31 NOTE — PATIENT INSTRUCTIONS
Joint Injection After [address] [facility]  Vanna Santana MD    Refer to this sheet in the next few days. These insructions provide you with information on caring for yourself after you have had a joint injection. Your caregiver also may give you more specific instructions. Your treatment has been planned according to current medical practices, but problems sometimes occur. Call your caregiver if you have any problems or questions after your procedure. After any type of joint injection, it is not uncommon to experience:     A temporary increase in pain which should resolve within 2-3 days   Soreness, swelling, or bruising around the injection site   Mild numbness, tingling, or weakness around the injection site caused by the numbing medicine used before or with the injection    It is also possible to experience the following effects associated with the specific agent after injection:     Corticosteroids (these effects are rare):  o Allergic reaction  o Increased blood sugar levels (if you have diabetes and you notice that your blood sugar levels have increased, notify your caregiver)  o Increased blood pressure levels  o Mood swings   Hyaluronic acid in the use of viscosupplementation  o Temporary heat or redness  o Temporary rash and itching  o Increased fluid accumulation in the injected join    These effects all should resolve within a day or two after your procedure. HOME CARE INSTRUCTIONS     Limit yourself to light activity the day of your procedure. Avoid lifting heavy objections, bending, stooping or twisting   Take prescription or over-the-counter pain medication as directed by your caregiver   You may apply ice to your injection site to reduce pain and swelling the day of your procedure.  Ice may be applied 3-4 times:  o Put ice in a plastic bag  o Place a towel between your skin and the bag  o Leave the ice on for no longer than 15-20 minutes each time    FOLLOW-UP INSTRUCTIONS    Please make sure you keep your follow-up appointment as indicated by your physician. Patient Education        Rotator Cuff Problems: Care Instructions  Overview     The rotator cuff is a group of tendons and muscles around the shoulder that keeps the shoulder joint stable. It is what allows you to raise and rotate your arm. Over time, daily wear and exercise can cause the tendons to rub on the bones of your shoulder. This is called impingement. This condition may causethe tendons to bruise, degenerate, or tear. In many people, these problems do not cause pain. When they do cause pain, you can do things to reduce the pain and swelling. These include rest, physical therapy, ice and heat, and anti-inflammatory medicine. If you still have pain after trying these treatments, you and your doctor can discuss having a steroidinjection or surgery. Follow-up care is a key part of your treatment and safety. Be sure to make and go to all appointments, and call your doctor if you are having problems. It's also a good idea to know your test results and keep alist of the medicines you take. How can you care for yourself at home?  Be safe with medicines. Read and follow all instructions on the label. ? If the doctor gave you a prescription medicine for pain, take it as prescribed. ? If you are not taking a prescription pain medicine, ask your doctor if you can take an over-the-counter medicine.  Put ice or a cold pack on your shoulder for 10 to 20 minutes at a time. Try to do this every 1 to 2 hours for the next 3 days (when you are awake). Put a thin cloth between the ice pack and your skin.  After 2 or 3 days, if you don't have swelling, apply heat. Put a warm water bottle, a heating pad set on low, or a warm cloth on your shoulder. Do not go to sleep with a heating pad on your skin. Put a thin cloth between the heating pad and your skin.  While holding a warm cloth on your shoulder, lean forward so your arm hangs freely, and gently swing your arm back and forth like a pendulum. You also can do this standing under a warm shower.  Follow your doctor's advice for physical therapy. When your doctor says it is okay, try these stretching exercises. Do them slowly to avoid injury. Put a warm, wet towel on your shoulder before exercising. Stop any exercise that increases pain. ? Range-of-motion exercises. If it is not too painful, stretch your arm in four directions: across the body, up the back, to the side, and overhead. ? Pendulum exercise. Lean forward and hold onto a table or the back of a chair with your good arm. Bend at the waist, letting the arm with the sore shoulder hang straight down. Swing your arm back and forth like a pendulum, then in circles that start small and slowly grow larger. This exercise does not use the arm muscles. Instead, use your legs and your hips to create movement that makes your arm swing freely. Try this for about 5 minutes, several times a day. ? Wall climbing (to the side). Stand with your side to a wall so that your fingers can just touch it. Then turn so your body is turned slightly toward the wall. Walk the fingers of your injured arm up the wall as high as pain permits. Try not to shrug your shoulder up toward your ear as you move your arm up. Hold that position for a count of 15 to 30 seconds. Walk your fingers down to the starting position. Repeat 2 to 4 times, trying to reach higher each time. ? Wall climbing (to the front). Face a wall, standing so your fingers can just touch it. Walk the fingers of your affected arm up the wall as high as pain permits. Try not to shrug your shoulder up toward your ear as you move your arm up. Hold that position for a count of 15 to 30 seconds. Slowly walk your fingers to the starting position. Repeat 2 to 4 times, trying to reach higher each time.  Rest your shoulder when you are not doing stretches and other exercises.  Your doctor may tell you to wait for the pain to go away before doing exercises. Do not lift heavy bags of groceries, play sports, or do anything else that makes you twist or stress your shoulder. Avoid activities where you move your affected arm above your head. When should you call for help? Call your doctor now or seek immediate medical care if:     You have severe pain.      You cannot move your shoulder or arm.      You have tingling or numbness in your arm or hand.      Your arm or hand is cool or pale. Watch closely for changes in your health, and be sure to contact your doctor if:     Your pain gets worse.      You have new or worse swelling in your arm or hand.      You do not get better as expected. Where can you learn more? Go to https://PPI.CloudWalk. org and sign in to your [employer] account. Enter E207 in the [employer] box to learn more about \"Rotator Cuff Problems: Care Instructions. \"     If you do not have an account, please click on the \"Sign Up Now\" link. Current as of: July 1, 2021               Content Version: 13.2  © 2006-2022 [employer]. Care instructions adapted under license by [facility] ([facility]). If you have questions about a medical condition or this instruction, always ask your healthcare professional. [address] any warranty or liability for your use of this information. Patient Education        Rotator Cuff: Exercises  Introduction  Here are some examples of exercises for you to try. The exercises may be suggested for a condition or for rehabilitation. Start each exercise slowly. Ease off the exercises if you start to have pain. You will be told when to start these exercises and which ones will work bestfor you. How to do the exercises  Pendulum swing    If you have pain in your back, do not do this exercise. 1. Hold on to a table or the back of a chair with your good arm.  Then bend forward a little and let your sore arm hang straight down. This exercise does not use the arm muscles. Rather, use your legs and your hips to create movement that makes your arm swing freely. 2. Use the movement from your hips and legs to guide the slightly swinging arm back and forth like a pendulum (or elephant trunk). Then guide it in circles that start small (about the size of a dinner plate). Make the circles a bit larger each day, as your pain allows. 3. Do this exercise for 5 minutes, 5 to 7 times each day. 4. As you have less pain, try bending over a little farther to do this exercise. This will increase the amount of movement at your shoulder. Posterior stretching exercise    1. Hold the elbow of your injured arm with your other hand. 2. Use your hand to pull your injured arm gently up and across your body. You will feel a gentle stretch across the back of your injured shoulder. 3. Hold for at least 15 to 30 seconds. Then slowly lower your arm. 4. Repeat 2 to 4 times. Up-the-back stretch    Your doctor or physical therapist may want you to wait to do this stretch until you have regained most of your range of motion and strength. You can do this stretch in different ways. Hold any of these stretches for at least 15 to 30seconds. Repeat them 2 to 4 times. 1. Light stretch: Put your hand in your back pocket. Let it rest there to stretch your shoulder. 2. Moderate stretch: With your other hand, hold your injured arm (palm outward) behind your back by the wrist. Pull your arm up gently to stretch your shoulder. 3. Advanced stretch: Put a towel over your other shoulder. Put the hand of your injured arm behind your back. Now hold the back end of the towel. With the other hand, hold the front end of the towel in front of your body. Pull gently on the front end of the towel. This will bring your hand farther up your back to stretch your shoulder. Overhead stretch    1. Standing about an arm's length away, grasp onto a solid surface. You could use a countertop, a doorknob, or the back of a sturdy chair. 2. With your knees slightly bent, bend forward with your arms straight. Lower your upper body, and let your shoulders stretch. 3. As your shoulders are able to stretch farther, you may need to take a step or two backward. 4. Hold for at least 15 to 30 seconds. Then stand up and relax. If you had stepped back during your stretch, step forward so you can keep your hands on the solid surface. 5. Repeat 2 to 4 times. Shoulder flexion (lying down)    To make a wand for this exercise, use a piece of PVC pipe or a broom handlewith the broom removed. Make the wand about a foot wider than your shoulders. 1. Lie on your back, holding a wand with both hands. Your palms should face down as you hold the wand. 2. Keeping your elbows straight, slowly raise your arms over your head. Raise them until you feel a stretch in your shoulders, upper back, and chest.  3. Hold for 15 to 30 seconds. 4. Repeat 2 to 4 times. Shoulder rotation (lying down)    To make a wand for this exercise, use a piece of PVC pipe or a broom handlewith the broom removed. Make the wand about a foot wider than your shoulders. 1. Lie on your back. Hold a wand with both hands with your elbows bent and palms up. 2. Keep your elbows close to your body, and move the wand across your body toward the sore arm. 3. Hold for 8 to 12 seconds. 4. Repeat 2 to 4 times. Wall climbing (to the side)    Avoid any movement that is straight to your side, and be careful not to archyour back. Your arm should stay about 30 degrees to the front of your side. 1. Stand with your side to a wall so that your fingers can just touch it at an angle about 30 degrees toward the front of your body. 2. Walk the fingers of your injured arm up the wall as high as pain permits. Try not to shrug your shoulder up toward your ear as you move your arm up.   3. Hold that position for a count of at least 15 to food.  Shoulder flexor and extensor exercise    These are isometric exercises. That means you contract your muscles withoutactually moving. 1. Push forward (flex): Stand facing a wall or doorjamb, about 6 inches or less back. Hold your injured arm against your body. Make a closed fist with your thumb on top. Then gently push your hand forward into the wall with about 25% to 50% of your strength. Don't let your body move backward as you push. Hold for about 6 seconds. Relax for a few seconds. Repeat 8 to 12 times. 2. Push backward (extend): Stand with your back flat against a wall. Your upper arm should be against the wall, with your elbow bent 90 degrees (your hand straight ahead). Push your elbow gently back against the wall with about 25% to 50% of your strength. Don't let your body move forward as you push. Hold for about 6 seconds. Relax for a few seconds. Repeat 8 to 12 times. Scapular exercise: Wall push-ups    This exercise is best done with your fingers somewhat turned out, rather thanstraight up and down. 1. Stand facing a wall, about 12 inches to 18 inches away. 2. Place your hands on the wall at shoulder height. 3. Slowly bend your elbows and bring your face to the wall. Keep your back and hips straight. 4. Push back to where you started. 5. Repeat 8 to 12 times. 6. When you can do this exercise against a wall comfortably, you can try it against a counter. You can then slowly progress to the end of a couch, then to a sturdy chair, and finally to the floor. Scapular exercise: Retraction    For this exercise, you will need elastic exercise material, such as surgicaltubing or Thera-Band. 1. Put the band around a solid object at about waist level. (A bedpost will work well.) Each hand should hold an end of the band. 2. With your elbows at your sides and bent to 90 degrees, pull the band back. Your shoulder blades should move toward each other. Then move your arms back where you started.   3. Repeat 8 to 12 times. 4. If you have good range of motion in your shoulders, try this exercise with your arms lifted out to the sides. Keep your elbows at a 90-degree angle. Raise the elastic band up to about shoulder level. Pull the band back to move your shoulder blades toward each other. Then move your arms back where you started. Internal rotator strengthening exercise    1. Start by tying a piece of elastic exercise material to a doorknob. You can use surgical tubing or Thera-Band. 2. Stand or sit with your shoulder relaxed and your elbow bent 90 degrees. Your upper arm should rest comfortably against your side. Squeeze a rolled towel between your elbow and your body for comfort. This will help keep your arm at your side. 3. Hold one end of the elastic band in the hand of the painful arm. 4. Slowly rotate your forearm toward your body until it touches your belly. Slowly move it back to where you started. 5. Keep your elbow and upper arm firmly tucked against the towel roll or at your side. 6. Repeat 8 to 12 times. External rotator strengthening exercise    1. Start by tying a piece of elastic exercise material to a doorknob. You can use surgical tubing or Thera-Band. (You may also hold one end of the band in each hand.)  2. Stand or sit with your shoulder relaxed and your elbow bent 90 degrees. Your upper arm should rest comfortably against your side. Squeeze a rolled towel between your elbow and your body for comfort. This will help keep your arm at your side. 3. Hold one end of the elastic band with the hand of the painful arm. 4. Start with your forearm across your belly. Slowly rotate the forearm out away from your body. Keep your elbow and upper arm tucked against the towel roll or the side of your body until you begin to feel tightness in your shoulder. Slowly move your arm back to where you started. 5. Repeat 8 to 12 times. Follow-up care is a key part of your treatment and safety.  Be sure to make and go to all appointments, and call your doctor if you are having problems. It's also a good idea to know your test results and keep alist of the medicines you take. Where can you learn more? Go to https://"[employer]bart.[employer]. org and sign in to your [employer] account. Enter Blake Mead in the [employer] box to learn more about \"Rotator Cuff: Exercises. \"     If you do not have an account, please click on the \"Sign Up Now\" link. Current as of: July 1, 2021               Content Version: 13.2  © 4363-1353 [employer]. Care instructions adapted under license by [facility] ([facility]). If you have questions about a medical condition or this instruction, always ask your healthcare professional. [address] any warranty or liability for your use of this information.

## 2022-03-31 NOTE — PROGRESS NOTES
[address] [facility]  Outpatient Progress Note  Azar Sorenson MD    Patient Name: Kelley Herndon MRN: [id number]   Age: 67 y.o. YOB: 1950   Sex: male      [facility] Complaint   Patient presents with    New Patient     Right Shoulder pain        HISTORY OF PRESENT ILLNESS   Kelley Herndon is a 67 y.o. male well pain in his right shoulder \"a walker he was doing some work on her boat. He was doing some heavy lifting reaching pushing and pulling. He did not feel a specific injury. A day or 2 later he noticed pain in the shoulder was having colic with difficulty elevating his arm to the side.   He is treated himself with ice and rest and anti-inflammatory medications and feels a little bit better but is still having trouble reaching up overhead and sleeping on his right side at night    PAST MEDICAL HISTORY      Past Medical History:   Diagnosis Date    Arthritis     CAD (coronary artery disease)     Cancer ([facility] [address]     basal cell    Hyperlipidemia     Hypertension     PONV (postoperative nausea and vomiting)     tends to get nauseated    Sleep apnea     uses CPAP       PAST SURGICAL HISTORY     Past Surgical History:   Procedure Laterality Date    CRANIOTOMY N/A 3/27/2019    RETROSIGMOID CRANIOTOMY AND REMOVAL OF TUMOR performed by An Teixeira MD at [address] [place] [address] LITHOTRIPSY         MEDICATIONS     Current Outpatient Medications   Medication Sig Dispense Refill    aspirin EC 81 MG EC tablet Take 1 tablet by mouth daily 30 tablet 0    levETIRAcetam (KEPPRA) 500 MG tablet Take 1 tablet by mouth 2 times daily 60 tablet 1    sennosides-docusate sodium (SENOKOT-S) 8.6-50 MG tablet Take 1 tablet by mouth 2 times daily 60 tablet 0    atorvastatin (LIPITOR) 80 MG tablet Take 40 mg by mouth daily One half a tablet daily      carvedilol (COREG) 25 MG tablet Take 12.5 mg by mouth 2 times daily (with meals) One-half tablet twice a day       No current facility-administered medications for this visit. ALLERGIES     Allergies   Allergen Reactions    Sulfa Antibiotics Nausea And Vomiting       FAMILY HISTORY   No family history on file. SOCIAL HISTORY     Social History     Socioeconomic History    Marital status:      Spouse name: None    Number of children: None    Years of education: None    Highest education level: None   Occupational History    None   Tobacco Use    Smoking status: Former Smoker     Types: Cigarettes     Quit date: 3/26/1984     Years since quittin.0    Smokeless tobacco: Never Used   Substance and Sexual Activity    Alcohol use: Yes     Alcohol/week: 7.0 standard drinks     Types: 7 Glasses of wine per week    Drug use: None    Sexual activity: None   Other Topics Concern    None   Social History Narrative    None     Social Determinants of Health     Financial Resource Strain:     Difficulty of Paying Living Expenses: Not on file   Food Insecurity:     Worried About Running Out of Food in the Last Year: Not on file    Roseline of Food in the Last Year: Not on file   Transportation Needs:     Lack of Transportation (Medical): Not on file    Lack of Transportation (Non-Medical):  Not on file   Physical Activity:     Days of Exercise per Week: Not on file    Minutes of Exercise per Session: Not on file   Stress:     Feeling of Stress : Not on file   Social Connections:     Frequency of Communication with Friends and Family: Not on file    Frequency of Social Gatherings with Friends and Family: Not on file    Attends Holiness Services: Not on file    Active Member of Clubs or Organizations: Not on file    Attends Club or Organization Meetings: Not on file    Marital Status: Not on file   Intimate Partner Violence:     Fear of Current or Ex-Partner: Not on file    Emotionally Abused: Not on file    Physically Abused: Not on file    Sexually Abused: Not on file   Housing cuff strength testing in abduction    RADIOLOGY   X-rays obtained and reviewed in office:  Views right shoulder 3 views    Impression: Moderate degenerative changes noted at the acromioclavicular joint    IMPRESSION     1. Impingement syndrome of right shoulder    2. Rotator cuff tendinitis, right         PLAN   I had a lengthy discussion with patient today regarding diagnosis and treatment options and recommendations. Recommend we proceed with a subacromial injection of corticosteroid. He can continue with over-the-counter nonsteroidal anti-inflammatory medications. I have instructed him on the appropriate home exercise program for rotator cuff strengthening. PROCEDURE     Subacromial Injection:  The patient consented to the procedure and a time out was performed before proceeding. The right shoulder was prepped in the normal sterile fashion. A 25 gauge needle was introduced into the subacromial space through a posterior approach. 2 mL of 1% Lidocaine and 1 mL of 40 mg/mL Depo-Medrol was injected into the subacromial space. The injection flowed freely, and the patient tolerated the procedure well. Post injection expectations were reviewed with the patient. FOLLOWUP     Return in about 4 weeks (around 4/28/2022) for Reevaluation. Orders Placed This Encounter   Procedures    XR SHOULDER RIGHT (MIN 2 VIEWS)     Standing Status:   Future     Number of Occurrences:   1     Standing Expiration Date:   3/28/2023     Order Specific Question:   Reason for exam:     Answer:   pain      No orders of the defined types were placed in this encounter.       Patient was instructed on appropriate use of braces, participation in home exercise programs, healthy lifestyle choices and weight loss as appropriate     Kaylie Singer MD

## 2023-02-28 ENCOUNTER — HOSPITAL ENCOUNTER (OUTPATIENT)
Age: 73
Discharge: HOME | End: 2023-02-28
Payer: COMMERCIAL

## 2023-02-28 ENCOUNTER — HOSPITAL ENCOUNTER (OUTPATIENT)
Dept: GENERAL RADIOLOGY | Age: 73
Discharge: HOME | End: 2023-02-28
Payer: COMMERCIAL

## 2023-02-28 DIAGNOSIS — Z00.00 ROUTINE GENERAL MEDICAL EXAMINATION AT A HEALTH CARE FACILITY: ICD-10-CM

## 2023-02-28 PROCEDURE — 71046 X-RAY EXAM CHEST 2 VIEWS: CPT

## (undated) DEVICE — NEURO SPONGES: Brand: DEROYAL

## (undated) DEVICE — HOOK RETRCT 12MM S STL BLNT E STAY LONE STAR

## (undated) DEVICE — PLATE ES AD W 9FT CRD 2

## (undated) DEVICE — COVER LT HNDL CAM BLU DISP W/ SURG CTRL

## (undated) DEVICE — 3M™ TEGADERM™ TRANSPARENT FILM DRESSING FRAME STYLE, 1624W, 2-3/8 IN X 2-3/4 IN (6 CM X 7 CM), 100/CT 4CT/CASE: Brand: 3M™ TEGADERM™

## (undated) DEVICE — 3L THIN WALL CAN: Brand: CRD

## (undated) DEVICE — SUTURE VCRL SZ 2-0 L18IN ABSRB UD CT-1 L36MM 1/2 CIR J839D

## (undated) DEVICE — PROBE 8225101 5PK STD PRASS FL TIP ROHS

## (undated) DEVICE — SURE SET-DOUBLE BASIN-LF: Brand: MEDLINE INDUSTRIES, INC.

## (undated) DEVICE — SPONGE,LAP,18"X18",DLX,XR,ST,5/PK,40/PK: Brand: MEDLINE

## (undated) DEVICE — COLLECTOR BONE DUST L3.8IN COX

## (undated) DEVICE — SPETZLER/BARRACUDA TIP, UNIVERSAL

## (undated) DEVICE — ROUND DIAMOND

## (undated) DEVICE — CODMAN® RANEY SCALP CLIPS 20 UNITS OF 10 CLIPS: Brand: CODMAN®

## (undated) DEVICE — SUTURE ETHLN SZ 3-0 L18IN NONABSORBABLE BLK PS-2 L19MM 3/8 1669H

## (undated) DEVICE — CODMAN® SURGICAL PATTIES 1/2" X 1/2" (1.27CM X 1.27CM): Brand: CODMAN®

## (undated) DEVICE — 7CM ELITE IRRIGATION SLEEVE

## (undated) DEVICE — SURGICAL SET UP - SURE SET: Brand: MEDLINE INDUSTRIES, INC.

## (undated) DEVICE — DISPOSABLE IRRIGATION CASSETTE: Brand: CORE

## (undated) DEVICE — DRESSING FOAM DISK DIA1IN H 7MM HYDRPHLC CHG IMPREG IN SL

## (undated) DEVICE — BLADE CLIPPER SURG SENSICLIP

## (undated) DEVICE — PRE OP PACK: Brand: MEDLINE INDUSTRIES, INC.

## (undated) DEVICE — INTENDED USE FOR SURGICAL MARKING ON INTACT SKIN, ALSO PROVIDES A PERMANENT METHOD OF IDENTIFYING OBJECTS IN THE OPERATING ROOM: Brand: WRITESITE® PLUS MINI PREP RESISTANT MARKER

## (undated) DEVICE — COTTON BALLS, DOUBLE STRUNG: Brand: DEROYAL

## (undated) DEVICE — SUTURE NRLN SZ 4-0 L18IN NONABSORBABLE BLK L13MM TF 1/2 CIR C584D

## (undated) DEVICE — PAD,NON-ADHERENT,3X8,STERILE,LF,1/PK: Brand: MEDLINE

## (undated) DEVICE — 3M™ WARMING BLANKET, LOWER BODY, 10 PER CASE, 42568: Brand: BAIR HUGGER™

## (undated) DEVICE — 1.5MM FINE DIAMOND, EXTENDED

## (undated) DEVICE — DRAPE MICSCP W132XL406CM LENS DIA68MM W VARI LENS2 FOR LEICA

## (undated) DEVICE — TOOL 10BA50-MN LEGEND 10CM 5MM BA: Brand: MIDAS REX™

## (undated) DEVICE — TOOL F2/8TA23 LEGEND 8CM 2.3MM TAPER: Brand: MIDAS REX™

## (undated) DEVICE — UNDERGLOVE SURG SZ 8 BLU LTX FREE SYN POLYISOPRENE POLYMER

## (undated) DEVICE — SOLUTION IV 1000ML 0.9% SOD CHL

## (undated) DEVICE — SPONGE GZ W4XL4IN COT 12 PLY TYP VII WVN C FLD DSGN

## (undated) DEVICE — ADAPTER LAB INTMED LUER 17GA

## (undated) DEVICE — CODMAN® INTEGRATED BIPOLAR CORD AND TUBING SET FLYING LEADS, ROTARY PUMP: Brand: CODMAN®

## (undated) DEVICE — DRAPE IRRIG FLD WRM W44XL44IN W/ AORN STD PRTBL INTRATEMP

## (undated) DEVICE — AGENT HEMSTAT W2XL4IN OXIDIZED REGENERATED CELOS ABSRB SFT

## (undated) DEVICE — GARMENT,MEDLINE,DVT,INT,CALF,MED, GEN2: Brand: MEDLINE

## (undated) DEVICE — SET BLOOD COLL 25GA L0.75N WITHOUT HLDR VNPNCTRE WTH ADPTR M

## (undated) DEVICE — STAPLER SKIN H3.9MM WIRE DIA0.58MM CRWN 6.9MM 35 STPL ROT

## (undated) DEVICE — JEWISH CRANI PACK: Brand: MEDLINE INDUSTRIES, INC.

## (undated) DEVICE — TRAY CATHETER 16FR F INCLUDE BARDX IC COMPLT CARE DRNGE BG

## (undated) DEVICE — CODMAN® SURGICAL PATTIES 1/4" X 1/4" (0.64CM X 0.64CM): Brand: CODMAN®

## (undated) DEVICE — GLOVE SURG SZ 75 L12IN FNGR THK94MIL TRNSLUC YEL LTX

## (undated) DEVICE — TURNOVER KIT RM INF CTRL TECH

## (undated) DEVICE — 2.0MM FINE DIAMOND, EXTENDED

## (undated) DEVICE — CHLORAPREP 26ML ORANGE

## (undated) DEVICE — 2.0MM FINE DIAMOND

## (undated) DEVICE — DISPOSABLE TUBING SET AND EXTENDER FILTER TUBING

## (undated) DEVICE — PROTECTOR ULN NRV PUR FOAM HK LOOP STRP ANATOMICALLY

## (undated) DEVICE — Device